# Patient Record
Sex: FEMALE | Race: WHITE | NOT HISPANIC OR LATINO | Employment: UNEMPLOYED | URBAN - METROPOLITAN AREA
[De-identification: names, ages, dates, MRNs, and addresses within clinical notes are randomized per-mention and may not be internally consistent; named-entity substitution may affect disease eponyms.]

---

## 2020-12-07 ENCOUNTER — OFFICE VISIT (OUTPATIENT)
Dept: OBSTETRICS AND GYNECOLOGY | Facility: CLINIC | Age: 55
End: 2020-12-07
Payer: COMMERCIAL

## 2020-12-07 VITALS
HEIGHT: 72 IN | BODY MASS INDEX: 24.38 KG/M2 | HEART RATE: 51 BPM | WEIGHT: 180 LBS | SYSTOLIC BLOOD PRESSURE: 117 MMHG | DIASTOLIC BLOOD PRESSURE: 74 MMHG

## 2020-12-07 DIAGNOSIS — Z12.31 BREAST CANCER SCREENING BY MAMMOGRAM: ICD-10-CM

## 2020-12-07 DIAGNOSIS — Z01.419 WELL WOMAN EXAM WITH ROUTINE GYNECOLOGICAL EXAM: Primary | ICD-10-CM

## 2020-12-07 DIAGNOSIS — N95.0 POST-MENOPAUSE BLEEDING: ICD-10-CM

## 2020-12-07 PROCEDURE — 99386 PREV VISIT NEW AGE 40-64: CPT | Mod: S$GLB,,, | Performed by: OBSTETRICS & GYNECOLOGY

## 2020-12-07 PROCEDURE — 3008F PR BODY MASS INDEX (BMI) DOCUMENTED: ICD-10-PCS | Mod: CPTII,S$GLB,, | Performed by: OBSTETRICS & GYNECOLOGY

## 2020-12-07 PROCEDURE — 3008F BODY MASS INDEX DOCD: CPT | Mod: CPTII,S$GLB,, | Performed by: OBSTETRICS & GYNECOLOGY

## 2020-12-07 PROCEDURE — 99386 PR PREVENTIVE VISIT,NEW,40-64: ICD-10-PCS | Mod: S$GLB,,, | Performed by: OBSTETRICS & GYNECOLOGY

## 2020-12-07 RX ORDER — ESTRADIOL 0.1 MG/G
CREAM VAGINAL DAILY
COMMUNITY
End: 2020-12-23 | Stop reason: SDUPTHER

## 2020-12-07 RX ORDER — ESCITALOPRAM OXALATE 10 MG/1
10 TABLET ORAL DAILY
COMMUNITY

## 2020-12-07 NOTE — PROGRESS NOTES
Subjective:       Patient ID: Idalmis Braden is a 55 y.o. female.    Chief Complaint:  Well Woman      History of Present Illness  She is doing well.  No new health issues,  No abnormal bleeding, No GI or Gu concerns,  No dyspareunia.     Her daughter is 16 .   Covid free     No GYN concerns  She has the mirena   She had it placed in   Rare spotting.   They moved in in July    No dyspareunia.    Rare IBS  colonoscpy 2 yrs ago in [place]  2 small polyps bx neg .  She saw Emanuel Valenzuela   She is on a HRT estradiol.  She is not a progesterone.    HPI      GYN & OB History  No LMP recorded. (Menstrual status: Other).     Date of Last Pap: No result found    OB History   No obstetric history on file.       Review of Systems  Review of Systems   Constitutional: Negative for activity change.   Eyes: Negative for visual disturbance.   Respiratory: Negative for shortness of breath.    Cardiovascular: Negative for chest pain.   Gastrointestinal: Negative for abdominal pain.   Genitourinary: Negative for vaginal bleeding.        No abnormal vaginal bleeding   Musculoskeletal: Negative for back pain.   Integumentary:  Negative for rash and breast mass.   Neurological: Negative for numbness.   Psychiatric/Behavioral:        No mood disturbance or changes    Breast: Negative for mass            Objective:    Physical Exam:   Constitutional: She is oriented to person, place, and time. She appears well-developed. She is cooperative.    HENT:   Head: Normocephalic.     Neck: Trachea normal. Neck supple. No thyromegaly present.    Cardiovascular: Normal rate, regular rhythm and normal heart sounds.     Pulmonary/Chest: Effort normal and breath sounds normal. Right breast exhibits no mass, no nipple discharge and no skin change. Left breast exhibits no mass, no nipple discharge and no skin change.   Bilateral fibrocystic changes  During the exam self breast exams discussed         Abdominal: Soft. Normal appearance. There  is no abdominal tenderness. There is no rebound and no guarding.     Genitourinary:    Vagina, uterus and rectum normal.   Rectum:      Guaiac result negative.   Guaiac negative stool.    Pelvic exam was performed with patient supine.   There is no lesion on the right labia. There is no lesion on the left labia. Uterus is not enlarged and not tender. Cervix is normal. Right adnexum displays no mass and no tenderness. Left adnexum displays no mass and no tenderness. No rectocele, cystocele or unspecified prolapse of vaginal walls in the vagina. Labial bartholins normal.Cervix exhibits no discharge. Additional cervical findings: pap smear done             Lymphadenopathy:        Head (right side): No submental and no submandibular adenopathy present.        Head (left side): No submental and no submandibular adenopathy present.     She has no cervical adenopathy.    Neurological: She is alert and oriented to person, place, and time.    Skin: Skin is warm.    Psychiatric: She has a normal mood and affect. Her speech is normal and behavior is normal. Thought content normal.          Assessment:        1. Well woman exam with routine gynecological exam    2. Breast cancer screening by mammogram    3. Post-menopause bleeding               Plan:      The IUD sting is not seen today   will do an U/s  She will wear the IUD for another year this a good progesterone support   .    I would Pap   Mammogram  Follow up one year or sooner if needed  Self breast exams  Consider health profile if labs not done with PCP  Bone density discussed   Pt is aware we call all results. If she does not hear from our office regarding her result within a week of having a study or procedure performed she is to call the office so that we can research the result for her as I do not know when she is scheduling her procedures.   Chaperone was present

## 2020-12-09 ENCOUNTER — PROCEDURE VISIT (OUTPATIENT)
Dept: OBSTETRICS AND GYNECOLOGY | Facility: CLINIC | Age: 55
End: 2020-12-09
Payer: COMMERCIAL

## 2020-12-09 DIAGNOSIS — N95.0 POST-MENOPAUSE BLEEDING: ICD-10-CM

## 2020-12-09 DIAGNOSIS — Z01.419 WELL WOMAN EXAM WITH ROUTINE GYNECOLOGICAL EXAM: ICD-10-CM

## 2020-12-09 DIAGNOSIS — Z12.31 BREAST CANCER SCREENING BY MAMMOGRAM: ICD-10-CM

## 2020-12-09 PROCEDURE — 76830 TRANSVAGINAL US NON-OB: CPT | Mod: S$GLB,,, | Performed by: OBSTETRICS & GYNECOLOGY

## 2020-12-09 PROCEDURE — 76830 PR  ECHOGRAPHY,TRANSVAGINAL: ICD-10-PCS | Mod: S$GLB,,, | Performed by: OBSTETRICS & GYNECOLOGY

## 2020-12-23 RX ORDER — ESTRADIOL 0.1 MG/G
1 CREAM VAGINAL
Qty: 42 G | Refills: 3 | Status: SHIPPED | OUTPATIENT
Start: 2020-12-24 | End: 2020-12-28

## 2020-12-23 NOTE — TELEPHONE ENCOUNTER
----- Message from Alondra Cason sent at 12/23/2020  2:13 PM CST -----  Type:  RX Refill Request    Who Called: pt   Refill or New Rx:refill  RX Name and Strength:estradioL (ESTRACE) 0.01 % (0.1 mg/gram) vaginal cream  How is the patient currently taking it? (ex. 1XDay):  Is this a 30 day or 90 day RX:90  Preferred Pharmacy with phone number:  Local or Mail Order:local   Ordering Provider:marlin   Would the patient rather a call back or a response via [facility]? Callback   Best Call Back Number:[phone]   Additional Information:

## 2020-12-24 ENCOUNTER — NURSE TRIAGE (OUTPATIENT)
Dept: ADMINISTRATIVE | Facility: CLINIC | Age: 55
End: 2020-12-24

## 2020-12-24 NOTE — TELEPHONE ENCOUNTER
Estradiol (Estrace) 1 mg by mouth daily instead of the vaginal cream.    Reason for Disposition   Caller requesting a NON-URGENT new prescription or refill and triager unable to refill per department policy    Additional Information   Negative: Drug overdose and triager unable to answer question   Negative: Caller requesting information unrelated to medicine   Negative: Caller requesting a prescription for Strep throat and has a positive culture result   Negative: Rash while taking a medication or within 3 days of stopping it   Negative: Immunization reaction suspected   Negative: Asthma and having symptoms of asthma (cough, wheezing, etc.)   Negative: Influenza symptoms and anti-viral med (such as Tamiflu) prescription request   Negative: Breastfeeding questions about mother's medicines and diet   Negative: Symptom of illness (e.g., headache, abdominal pain, earache, vomiting) that are more than mild   Negative: MORE THAN A DOUBLE DOSE of a prescription or over-the-counter (OTC) drug   Negative: DOUBLE DOSE (an extra dose or lesser amount) of over-the-counter (OTC) drug and any symptoms (e.g., dizziness, nausea, pain, sleepiness)   Negative: DOUBLE DOSE (an extra dose or lesser amount) of prescription drug and any symptoms (e.g., dizziness, nausea, pain, sleepiness)   Negative: Took another person's prescription drug   Negative: DOUBLE DOSE (an extra dose or lesser amount) of prescription drug and NO symptoms (Exception: a double dose of antibiotics)   Negative: Diabetes drug error or overdose (e.g., took wrong type of insulin or took extra dose)   Negative: Caller has medication question about med not prescribed by PCP and triager unable to answer question (e.g., compatibility with other med, storage)   Negative: Caller has NON-URGENT medication question about med that PCP or specialist prescribed and triager unable to answer question   Negative: Request for URGENT new prescription or refill of  'essential' medication (i.e., likelihood of harm to patient if not taken) and triager unable to fill per department policy   Negative: Prescription not at pharmacy and was prescribed by PCP recently (Exception: triager has access to EMR and prescription is recorded there. Go to Home Care and confirm for pharmacy.)   Negative: Pharmacy calling with prescription questions and triager unable to answer question   Negative: Caller has URGENT medication question about med that PCP or specialist prescribed and triager unable to answer question    Protocols used: MEDICATION QUESTION CALL-A-[place]

## 2020-12-28 RX ORDER — ESTRADIOL 1 MG/1
1 TABLET ORAL DAILY
Qty: 90 TABLET | Refills: 3 | Status: SHIPPED | OUTPATIENT
Start: 2020-12-28 | End: 2021-12-28

## 2021-02-08 ENCOUNTER — TELEPHONE (OUTPATIENT)
Dept: OBSTETRICS AND GYNECOLOGY | Facility: CLINIC | Age: 56
End: 2021-02-08

## 2021-02-08 DIAGNOSIS — Z91.89 AT HIGH RISK FOR BREAST CANCER: Primary | ICD-10-CM

## 2021-02-08 DIAGNOSIS — Z12.39 ENCOUNTER FOR SCREENING FOR MALIGNANT NEOPLASM OF BREAST, UNSPECIFIED SCREENING MODALITY: ICD-10-CM

## 2021-02-08 DIAGNOSIS — N60.89 OTHER BENIGN MAMMARY DYSPLASIAS OF UNSPECIFIED BREAST: ICD-10-CM

## 2021-03-25 DIAGNOSIS — Z79.890 HORMONE REPLACEMENT THERAPY: Primary | ICD-10-CM

## 2021-03-25 RX ORDER — ESTRADIOL 1 MG/1
1 TABLET ORAL DAILY
Qty: 30 TABLET | Refills: 11 | Status: SHIPPED | OUTPATIENT
Start: 2021-03-25 | End: 2022-01-18 | Stop reason: SDUPTHER

## 2021-06-07 ENCOUNTER — PATIENT OUTREACH (OUTPATIENT)
Dept: ADMINISTRATIVE | Facility: HOSPITAL | Age: 56
End: 2021-06-07

## 2021-11-18 ENCOUNTER — TELEPHONE (OUTPATIENT)
Dept: OBSTETRICS AND GYNECOLOGY | Facility: CLINIC | Age: 56
End: 2021-11-18
Payer: COMMERCIAL

## 2022-01-18 ENCOUNTER — OFFICE VISIT (OUTPATIENT)
Dept: OBSTETRICS AND GYNECOLOGY | Facility: CLINIC | Age: 57
End: 2022-01-18
Payer: COMMERCIAL

## 2022-01-18 VITALS
BODY MASS INDEX: 25.77 KG/M2 | SYSTOLIC BLOOD PRESSURE: 128 MMHG | WEIGHT: 190 LBS | HEART RATE: 58 BPM | DIASTOLIC BLOOD PRESSURE: 84 MMHG

## 2022-01-18 DIAGNOSIS — Z01.419 WELL WOMAN EXAM WITH ROUTINE GYNECOLOGICAL EXAM: Primary | ICD-10-CM

## 2022-01-18 DIAGNOSIS — Z12.31 BREAST CANCER SCREENING BY MAMMOGRAM: ICD-10-CM

## 2022-01-18 DIAGNOSIS — Z78.0 MENOPAUSE: ICD-10-CM

## 2022-01-18 DIAGNOSIS — F41.9 ANXIETY: ICD-10-CM

## 2022-01-18 DIAGNOSIS — F52.0 HYPOACTIVE SEXUAL DESIRE DISORDER: ICD-10-CM

## 2022-01-18 DIAGNOSIS — Z79.890 HORMONE REPLACEMENT THERAPY: ICD-10-CM

## 2022-01-18 PROCEDURE — 3079F DIAST BP 80-89 MM HG: CPT | Mod: CPTII,S$GLB,, | Performed by: OBSTETRICS & GYNECOLOGY

## 2022-01-18 PROCEDURE — 3074F SYST BP LT 130 MM HG: CPT | Mod: CPTII,S$GLB,, | Performed by: OBSTETRICS & GYNECOLOGY

## 2022-01-18 PROCEDURE — 99396 PREV VISIT EST AGE 40-64: CPT | Mod: S$GLB,,, | Performed by: OBSTETRICS & GYNECOLOGY

## 2022-01-18 PROCEDURE — 3079F PR MOST RECENT DIASTOLIC BLOOD PRESSURE 80-89 MM HG: ICD-10-PCS | Mod: CPTII,S$GLB,, | Performed by: OBSTETRICS & GYNECOLOGY

## 2022-01-18 PROCEDURE — 1159F PR MEDICATION LIST DOCUMENTED IN MEDICAL RECORD: ICD-10-PCS | Mod: CPTII,S$GLB,, | Performed by: OBSTETRICS & GYNECOLOGY

## 2022-01-18 PROCEDURE — 3008F PR BODY MASS INDEX (BMI) DOCUMENTED: ICD-10-PCS | Mod: CPTII,S$GLB,, | Performed by: OBSTETRICS & GYNECOLOGY

## 2022-01-18 PROCEDURE — 1159F MED LIST DOCD IN RCRD: CPT | Mod: CPTII,S$GLB,, | Performed by: OBSTETRICS & GYNECOLOGY

## 2022-01-18 PROCEDURE — 3008F BODY MASS INDEX DOCD: CPT | Mod: CPTII,S$GLB,, | Performed by: OBSTETRICS & GYNECOLOGY

## 2022-01-18 PROCEDURE — 3074F PR MOST RECENT SYSTOLIC BLOOD PRESSURE < 130 MM HG: ICD-10-PCS | Mod: CPTII,S$GLB,, | Performed by: OBSTETRICS & GYNECOLOGY

## 2022-01-18 PROCEDURE — 99396 PR PREVENTIVE VISIT,EST,40-64: ICD-10-PCS | Mod: S$GLB,,, | Performed by: OBSTETRICS & GYNECOLOGY

## 2022-01-18 RX ORDER — ALPRAZOLAM 0.5 MG/1
0.5 TABLET ORAL EVERY 8 HOURS PRN
Qty: 28 TABLET | Refills: 0 | Status: SHIPPED | OUTPATIENT
Start: 2022-01-18 | End: 2023-02-28

## 2022-01-18 RX ORDER — ESTRADIOL 1 MG/1
1 TABLET ORAL DAILY
Qty: 30 TABLET | Refills: 11 | Status: SHIPPED | OUTPATIENT
Start: 2022-01-18 | End: 2022-03-25 | Stop reason: SDUPTHER

## 2022-01-18 RX ORDER — FLIBANSERIN 100 MG/1
100 TABLET, FILM COATED ORAL DAILY
Qty: 30 TABLET | Refills: 12 | Status: SHIPPED | OUTPATIENT
Start: 2022-01-18 | End: 2023-02-28

## 2022-01-18 RX ORDER — PROGESTERONE 100 MG/1
100 CAPSULE ORAL NIGHTLY
Qty: 90 CAPSULE | Refills: 3 | Status: SHIPPED | OUTPATIENT
Start: 2022-01-18 | End: 2023-02-28

## 2022-01-18 NOTE — PROGRESS NOTES
Subjective:       Patient ID: Idalmis Braden is a 56 y.o. female.    Chief Complaint:  Well Woman      History of Present Illness  She is doing well.  No new health issues,  No abnormal bleeding, No GI or Gu concerns,  No dyspareunia.   Decrease desire   Wearing her IUD  On HRT   Does not desire it removed this year   No prob   HPI      GYN & OB History  No LMP recorded. (Menstrual status: Other).     Date of Last Pap: No result found    OB History   No obstetric history on file.       Review of Systems  Review of Systems   Constitutional: Negative for activity change.   Eyes: Negative for visual disturbance.   Respiratory: Negative for shortness of breath.    Cardiovascular: Negative for chest pain.   Gastrointestinal: Negative for abdominal pain.   Genitourinary: Negative for vaginal bleeding.        No abnormal vaginal bleeding   Musculoskeletal: Negative for back pain.   Integumentary:  Negative for rash and breast mass.   Neurological: Negative for numbness.   Psychiatric/Behavioral:        No mood disturbance or changes    Breast: Negative for mass            Objective:    Physical Exam:   Constitutional: She is oriented to person, place, and time. She appears well-developed. She is cooperative.    HENT:   Head: Normocephalic.     Neck: Trachea normal. No thyromegaly present.    Cardiovascular: Normal rate, regular rhythm and normal heart sounds.     Pulmonary/Chest: Effort normal and breath sounds normal. Right breast exhibits no mass, no nipple discharge and no skin change. Left breast exhibits no mass, no nipple discharge and no skin change.   Bilateral fibrocystic changes  During the exam self breast exams discussed         Abdominal: Soft. There is no abdominal tenderness. There is no rebound and no guarding.     Genitourinary:    Vagina, uterus and rectum normal.   Rectum:      Guaiac result negative.   Guaiac negative stool.    Pelvic exam was performed with patient supine.   Labial bartholins  normal.There is no lesion on the right labia. There is no lesion on the left labia. Cervix is normal. Right adnexum displays no mass and no tenderness. Left adnexum displays no mass and no tenderness. Cervix exhibits no discharge. Also,  pap results normal   Uterus is not enlarged and not tender.    Genitourinary Comments: The iiud string is seen she is not ready for removal                Lymphadenopathy:        Head (right side): No submental and no submandibular adenopathy present.        Head (left side): No submental and no submandibular adenopathy present.     She has no cervical adenopathy.    Neurological: She is alert and oriented to person, place, and time.    Skin: Skin is warm.    Psychiatric: She has a normal mood and affect. Her speech is normal and behavior is normal. Thought content normal.          Assessment:        1. Well woman exam with routine gynecological exam    2. Breast cancer screening by mammogram    3. Anxiety    4. Hypoactive sexual desire disorder    5. Menopause               Plan:           Add progesterone   Try addyi discussed   Pap   Mammogram  Follow up one year or sooner if needed  Self breast exams  Consider health profile if labs not done with PCP  Recommend colonoscopy as indicated  Bone density discussed   Pt is aware we call all results. If she does not hear from our office regarding her result within a week of having a study or procedure performed she is to call the office so that we can research the result for her as I do not know when she is scheduling her procedures.   Chaperone was present

## 2023-02-28 ENCOUNTER — OFFICE VISIT (OUTPATIENT)
Dept: OBSTETRICS AND GYNECOLOGY | Facility: CLINIC | Age: 58
End: 2023-02-28
Payer: COMMERCIAL

## 2023-02-28 VITALS
WEIGHT: 187 LBS | BODY MASS INDEX: 25.36 KG/M2 | HEART RATE: 96 BPM | DIASTOLIC BLOOD PRESSURE: 81 MMHG | SYSTOLIC BLOOD PRESSURE: 115 MMHG

## 2023-02-28 DIAGNOSIS — Z79.890 HORMONE REPLACEMENT THERAPY (HRT): ICD-10-CM

## 2023-02-28 DIAGNOSIS — Z78.0 MENOPAUSE: ICD-10-CM

## 2023-02-28 DIAGNOSIS — Z12.31 BREAST CANCER SCREENING BY MAMMOGRAM: ICD-10-CM

## 2023-02-28 DIAGNOSIS — Z91.89 AT HIGH RISK FOR BREAST CANCER: ICD-10-CM

## 2023-02-28 DIAGNOSIS — Z01.419 WELL WOMAN EXAM WITH ROUTINE GYNECOLOGICAL EXAM: Primary | ICD-10-CM

## 2023-02-28 DIAGNOSIS — Z12.11 COLON CANCER SCREENING: ICD-10-CM

## 2023-02-28 PROCEDURE — 3074F PR MOST RECENT SYSTOLIC BLOOD PRESSURE < 130 MM HG: ICD-10-PCS | Mod: CPTII,S$GLB,, | Performed by: OBSTETRICS & GYNECOLOGY

## 2023-02-28 PROCEDURE — 3074F SYST BP LT 130 MM HG: CPT | Mod: CPTII,S$GLB,, | Performed by: OBSTETRICS & GYNECOLOGY

## 2023-02-28 PROCEDURE — 3008F BODY MASS INDEX DOCD: CPT | Mod: CPTII,S$GLB,, | Performed by: OBSTETRICS & GYNECOLOGY

## 2023-02-28 PROCEDURE — 99396 PREV VISIT EST AGE 40-64: CPT | Mod: S$GLB,,, | Performed by: OBSTETRICS & GYNECOLOGY

## 2023-02-28 PROCEDURE — 3079F DIAST BP 80-89 MM HG: CPT | Mod: CPTII,S$GLB,, | Performed by: OBSTETRICS & GYNECOLOGY

## 2023-02-28 PROCEDURE — 3079F PR MOST RECENT DIASTOLIC BLOOD PRESSURE 80-89 MM HG: ICD-10-PCS | Mod: CPTII,S$GLB,, | Performed by: OBSTETRICS & GYNECOLOGY

## 2023-02-28 PROCEDURE — 1159F PR MEDICATION LIST DOCUMENTED IN MEDICAL RECORD: ICD-10-PCS | Mod: CPTII,S$GLB,, | Performed by: OBSTETRICS & GYNECOLOGY

## 2023-02-28 PROCEDURE — 3008F PR BODY MASS INDEX (BMI) DOCUMENTED: ICD-10-PCS | Mod: CPTII,S$GLB,, | Performed by: OBSTETRICS & GYNECOLOGY

## 2023-02-28 PROCEDURE — 1159F MED LIST DOCD IN RCRD: CPT | Mod: CPTII,S$GLB,, | Performed by: OBSTETRICS & GYNECOLOGY

## 2023-02-28 PROCEDURE — 99396 PR PREVENTIVE VISIT,EST,40-64: ICD-10-PCS | Mod: S$GLB,,, | Performed by: OBSTETRICS & GYNECOLOGY

## 2023-02-28 NOTE — PROGRESS NOTES
Subjective:       Patient ID: Idalmis Braden is a 57 y.o. female.    Chief Complaint:  Well Woman      History of Present Illness  She is doing well.  No new health issues,  No abnormal bleeding, No GI or Gu concerns,  No dyspareunia.   No dyspareunia  no GI or   she is on HRT   she is estrace and wears a mirena      HPI      GYN & OB History  No LMP recorded. (Menstrual status: Other).     Date of Last Pap: No result found    OB History   No obstetric history on file.       Review of Systems  Review of Systems   Constitutional:  Negative for activity change.   Eyes:  Negative for visual disturbance.   Respiratory:  Negative for shortness of breath.    Cardiovascular:  Negative for chest pain.   Gastrointestinal:  Negative for abdominal pain.   Genitourinary:  Negative for vaginal bleeding.        No abnormal vaginal bleeding   Musculoskeletal:  Negative for back pain.   Integumentary:  Negative for rash and breast mass.   Neurological:  Negative for numbness.   Psychiatric/Behavioral:          No mood disturbance or changes    Breast: Negative for mass          Objective:    Physical Exam:   Constitutional: She is oriented to person, place, and time. She appears well-developed. She is cooperative.    HENT:   Head: Normocephalic.     Neck: Trachea normal. No thyromegaly present.    Cardiovascular:  Normal rate, regular rhythm and normal heart sounds.             Pulmonary/Chest: Effort normal and breath sounds normal. Right breast exhibits no mass, no nipple discharge and no skin change. Left breast exhibits no mass, no nipple discharge and no skin change.   Bilateral fibrocystic changes  During the exam self breast exams discussed         Abdominal: Soft. There is no abdominal tenderness. There is no rebound and no guarding.     Genitourinary:    Vagina, uterus and rectum normal.   Rectum:      Guaiac result negative.   Guaiac negative stool.    Pelvic exam was performed with patient supine.   Labial  bartholins normal.There is no lesion on the right labia. There is no lesion on the left labia. Cervix is normal. Right adnexum displays no mass and no tenderness. Left adnexum displays no mass and no tenderness. Cervix exhibits no discharge. Uterus is not enlarged and not tender.              Lymphadenopathy:        Head (right side): No submental and no submandibular adenopathy present.        Head (left side): No submental and no submandibular adenopathy present.     She has no cervical adenopathy.    Neurological: She is alert and oriented to person, place, and time.    Skin: Skin is warm.    Psychiatric: She has a normal mood and affect. Her speech is normal and behavior is normal. Thought content normal.        Assessment:        1. Well woman exam with routine gynecological exam    2. Breast cancer screening by mammogram    3. At high risk for breast cancer               Plan:         Needs mammogram and mri    discussed     Pap   Mammogram  Follow up one year or sooner if needed  Self breast exams  Consider health profile if labs not done with PCP  Recommend colonoscopy as indicated  Bone density discussed   Pt is aware we call all results. If she does not hear from our office regarding her result within a week of having a study or procedure performed she is to call the office so that we can research the result for her as I do not know when she is scheduling her procedures.   Chaperone was present

## 2023-03-01 ENCOUNTER — PROCEDURE VISIT (OUTPATIENT)
Dept: OBSTETRICS AND GYNECOLOGY | Facility: CLINIC | Age: 58
End: 2023-03-01
Payer: COMMERCIAL

## 2023-03-01 DIAGNOSIS — Z78.0 MENOPAUSE: ICD-10-CM

## 2023-03-01 DIAGNOSIS — Z91.89 AT HIGH RISK FOR BREAST CANCER: ICD-10-CM

## 2023-03-01 DIAGNOSIS — Z79.890 HORMONE REPLACEMENT THERAPY (HRT): ICD-10-CM

## 2023-03-01 PROCEDURE — 76830 TRANSVAGINAL US NON-OB: CPT | Mod: S$GLB,,, | Performed by: OBSTETRICS & GYNECOLOGY

## 2023-03-01 PROCEDURE — 76830 US OB/GYN PROCEDURE (VIEWPOINT): ICD-10-PCS | Mod: S$GLB,,, | Performed by: OBSTETRICS & GYNECOLOGY

## 2023-03-02 DIAGNOSIS — R93.89 THICKENED ENDOMETRIUM: ICD-10-CM

## 2023-03-02 DIAGNOSIS — Z78.0 MENOPAUSE: Primary | ICD-10-CM

## 2023-03-02 DIAGNOSIS — R93.89 THICKENED ENDOMETRIUM: Primary | ICD-10-CM

## 2023-03-02 RX ORDER — PROGESTERONE 200 MG/1
200 CAPSULE ORAL NIGHTLY
Qty: 30 CAPSULE | Refills: 2 | Status: SHIPPED | OUTPATIENT
Start: 2023-03-02 | End: 2023-07-10

## 2023-03-02 RX ORDER — PROGESTERONE 100 MG/1
100 CAPSULE ORAL NIGHTLY
Qty: 90 CAPSULE | Refills: 3 | Status: CANCELLED | OUTPATIENT
Start: 2023-03-02 | End: 2024-03-01

## 2023-03-02 NOTE — PROGRESS NOTES
03/02/2023 5:00 PM Spoke to patient about results. She is going to start on the progesterone 100mg qhs and see how she does on it before deciding to either remove or replace the mirena. She would like to do a follow up u/s to check her endometrial lining since it was slightly thickened. Scheduled u/s in 6 months. Patient was advised to contact our office to let us know when she is ready to remove or replace the mirena. Patient voiced understanding.

## 2023-03-07 LAB — Lab: NORMAL

## 2023-07-10 RX ORDER — PROGESTERONE 200 MG/1
200 CAPSULE ORAL NIGHTLY
Qty: 90 CAPSULE | Refills: 3 | Status: SHIPPED | OUTPATIENT
Start: 2023-07-10 | End: 2023-07-11 | Stop reason: SDUPTHER

## 2023-07-11 DIAGNOSIS — Z78.0 MENOPAUSE: Primary | ICD-10-CM

## 2023-07-11 RX ORDER — PROGESTERONE 200 MG/1
200 CAPSULE ORAL NIGHTLY
Qty: 90 CAPSULE | Refills: 3 | Status: SHIPPED | OUTPATIENT
Start: 2023-07-11 | End: 2024-07-10

## 2023-08-15 ENCOUNTER — PROCEDURE VISIT (OUTPATIENT)
Dept: OBSTETRICS AND GYNECOLOGY | Facility: CLINIC | Age: 58
End: 2023-08-15
Payer: COMMERCIAL

## 2023-08-15 DIAGNOSIS — R93.89 THICKENED ENDOMETRIUM: ICD-10-CM

## 2023-08-15 PROCEDURE — 76830 TRANSVAGINAL US NON-OB: CPT | Mod: S$GLB,,, | Performed by: OBSTETRICS & GYNECOLOGY

## 2023-08-15 PROCEDURE — 76830 US OB/GYN PROCEDURE (VIEWPOINT): ICD-10-PCS | Mod: S$GLB,,, | Performed by: OBSTETRICS & GYNECOLOGY

## 2023-12-22 ENCOUNTER — TELEPHONE (OUTPATIENT)
Dept: OBSTETRICS AND GYNECOLOGY | Facility: CLINIC | Age: 58
End: 2023-12-22
Payer: COMMERCIAL

## 2023-12-22 NOTE — TELEPHONE ENCOUNTER
I returned the pt's call today. She states that she had a a CT scan completed w/ her PCP. Allegedly, the issues she had the CT scan for proved to be caused by the IUD she has in place. She needs to schedule a clinic appt to discuss this and potentially have this removed. She gave verbal understanding that Dr. Hardy and his staff are out of the office today and until 12/27/2023, but will be expecting a call when they return. Debora Diehl

## 2023-12-22 NOTE — TELEPHONE ENCOUNTER
----- Message from Jamarcus Ramos sent at 12/22/2023  8:30 AM CST -----  Contact: jorge Raygoza is calling regarding test results.  Please give her a call back at [phone]

## 2023-12-27 ENCOUNTER — TELEPHONE (OUTPATIENT)
Dept: OBSTETRICS AND GYNECOLOGY | Facility: CLINIC | Age: 58
End: 2023-12-27
Payer: COMMERCIAL

## 2023-12-27 NOTE — TELEPHONE ENCOUNTER
Spoke to patient, she states she is needing to schedule to have her IUD removed as she recently was experiencing blood in her urine and after doing a CT scan of her abdomen she was told the IUD is what is causing the problems. Patient is scheduled for next available to have IUD removed.

## 2023-12-28 ENCOUNTER — PROCEDURE VISIT (OUTPATIENT)
Dept: OBSTETRICS AND GYNECOLOGY | Facility: CLINIC | Age: 58
End: 2023-12-28
Payer: COMMERCIAL

## 2023-12-28 VITALS
DIASTOLIC BLOOD PRESSURE: 79 MMHG | WEIGHT: 181 LBS | HEART RATE: 76 BPM | SYSTOLIC BLOOD PRESSURE: 127 MMHG | BODY MASS INDEX: 24.55 KG/M2

## 2023-12-28 DIAGNOSIS — R10.2 PELVIC PAIN: Primary | ICD-10-CM

## 2023-12-28 PROCEDURE — 99499 NO LOS: ICD-10-PCS | Mod: S$GLB,,, | Performed by: OBSTETRICS & GYNECOLOGY

## 2023-12-28 PROCEDURE — 58301 REMOVAL OF IUD: ICD-10-PCS | Mod: S$GLB,,, | Performed by: OBSTETRICS & GYNECOLOGY

## 2023-12-28 PROCEDURE — 99499 UNLISTED E&M SERVICE: CPT | Mod: S$GLB,,, | Performed by: OBSTETRICS & GYNECOLOGY

## 2023-12-28 PROCEDURE — 58301 REMOVE INTRAUTERINE DEVICE: CPT | Mod: S$GLB,,, | Performed by: OBSTETRICS & GYNECOLOGY

## 2023-12-29 NOTE — PROCEDURES
Removal of IUD    Date/Time: 12/28/2023 10:00 AM    Performed by: Guanaco Hardy III, MD  Authorized by: Guanaco Hardy III, MD    Procedure risks and benefits discussed: yes    Patient questions answered: yes    Educational handouts given: no    Removed with no complications: yes     Dom very well

## 2024-03-13 DIAGNOSIS — Z79.890 HORMONE REPLACEMENT THERAPY: ICD-10-CM

## 2024-03-13 RX ORDER — ESTRADIOL 1 MG/1
TABLET ORAL
Qty: 90 TABLET | Refills: 3 | Status: SHIPPED | OUTPATIENT
Start: 2024-03-13

## 2024-03-14 ENCOUNTER — OFFICE VISIT (OUTPATIENT)
Dept: OBSTETRICS AND GYNECOLOGY | Facility: CLINIC | Age: 59
End: 2024-03-14
Payer: COMMERCIAL

## 2024-03-14 VITALS
SYSTOLIC BLOOD PRESSURE: 123 MMHG | BODY MASS INDEX: 24.65 KG/M2 | HEART RATE: 83 BPM | DIASTOLIC BLOOD PRESSURE: 87 MMHG | WEIGHT: 182 LBS | HEIGHT: 72 IN

## 2024-03-14 DIAGNOSIS — Z79.890 HORMONE REPLACEMENT THERAPY: ICD-10-CM

## 2024-03-14 DIAGNOSIS — R31.29 MICROSCOPIC HEMATURIA: ICD-10-CM

## 2024-03-14 DIAGNOSIS — Z12.31 BREAST CANCER SCREENING BY MAMMOGRAM: Primary | ICD-10-CM

## 2024-03-14 DIAGNOSIS — N95.0 POSTMENOPAUSE BLEEDING: ICD-10-CM

## 2024-03-14 DIAGNOSIS — N95.0 POSTMENOPAUSE BLEEDING: Primary | ICD-10-CM

## 2024-03-14 DIAGNOSIS — Z01.419 WELL WOMAN EXAM WITH ROUTINE GYNECOLOGICAL EXAM: ICD-10-CM

## 2024-03-14 PROCEDURE — 3008F BODY MASS INDEX DOCD: CPT | Mod: CPTII,S$GLB,, | Performed by: OBSTETRICS & GYNECOLOGY

## 2024-03-14 PROCEDURE — 3074F SYST BP LT 130 MM HG: CPT | Mod: CPTII,S$GLB,, | Performed by: OBSTETRICS & GYNECOLOGY

## 2024-03-14 PROCEDURE — 3079F DIAST BP 80-89 MM HG: CPT | Mod: CPTII,S$GLB,, | Performed by: OBSTETRICS & GYNECOLOGY

## 2024-03-14 PROCEDURE — 99396 PREV VISIT EST AGE 40-64: CPT | Mod: S$GLB,,, | Performed by: OBSTETRICS & GYNECOLOGY

## 2024-03-14 PROCEDURE — 1159F MED LIST DOCD IN RCRD: CPT | Mod: CPTII,S$GLB,, | Performed by: OBSTETRICS & GYNECOLOGY

## 2024-03-14 NOTE — PROGRESS NOTES
Subjective:       Patient ID: Idalmis Braden is a 58 y.o. female.    Chief Complaint:  Well Woman      History of Present Illness  She is doing well.  No new health issues,  No abnormal bleeding, No GI or Gu concerns,  No dyspareunia.   She is doing well and on her HRT but she was noted to have some post menopausal bleeding.   She does have her colonoscopy set up and in addition   she is also having a cysto for microscopic hematuria.      HPI      GYN & OB History  No LMP recorded. Patient is postmenopausal.     Date of Last Pap: No result found    OB History   No obstetric history on file.       Review of Systems  Review of Systems   Constitutional:  Negative for activity change.   Eyes:  Negative for visual disturbance.   Respiratory:  Negative for shortness of breath.    Cardiovascular:  Negative for chest pain.   Gastrointestinal:  Negative for abdominal pain.   Genitourinary:  Negative for vaginal bleeding.        No abnormal vaginal bleeding   Musculoskeletal:  Negative for back pain.   Integumentary:  Negative for rash and breast mass.   Neurological:  Negative for numbness.   Psychiatric/Behavioral:          No mood disturbance or changes    Breast: Negative for mass            Objective:    Physical Exam:   Constitutional: She is oriented to person, place, and time. She appears well-developed. She is cooperative.    HENT:   Head: Normocephalic.     Neck: Trachea normal. No thyromegaly present.    Cardiovascular:  Normal rate, regular rhythm and normal heart sounds.             Pulmonary/Chest: Effort normal and breath sounds normal. Right breast exhibits no mass, no nipple discharge and no skin change. Left breast exhibits no mass, no nipple discharge and no skin change.        Abdominal: Soft. There is no abdominal tenderness. There is no rebound and no guarding.     Genitourinary:    Vagina and uterus normal.      Pelvic exam was performed with patient supine.     Labial bartholins normal.There is no  lesion on the right labia. There is no lesion on the left labia. Cervix is normal. Right adnexum displays no mass and no tenderness. Left adnexum displays no mass and no tenderness. Cervix exhibits no discharge. Uterus is not enlarged and not tender.              Lymphadenopathy:        Head (right side): No submental and no submandibular adenopathy present.        Head (left side): No submental and no submandibular adenopathy present.     She has no cervical adenopathy.    Neurological: She is alert and oriented to person, place, and time.    Skin: Skin is warm.    Psychiatric: She has a normal mood and affect. Her speech is normal and behavior is normal. Thought content normal.          Assessment:        1. Breast cancer screening by mammogram    2. Well woman exam with routine gynecological exam    3. Hormone replacement therapy    4. Microscopic hematuria    5. Postmenopause bleeding               Plan:             Pap  this is done   Mammogram  Follow up one year or sooner if needed  Self breast exams  Consider health profile if labs not done with PCP  Recommend colonoscopy as indicated  Bone density discussed   Pt is aware we call all results. If she does not hear from our office regarding her result within a week of having a study or procedure performed she is to call the office so that we can research the result for her as I do not know when she is scheduling her procedures.   Chaperone was present

## 2024-03-28 ENCOUNTER — PROCEDURE VISIT (OUTPATIENT)
Dept: OBSTETRICS AND GYNECOLOGY | Facility: CLINIC | Age: 59
End: 2024-03-28
Payer: COMMERCIAL

## 2024-03-28 DIAGNOSIS — N95.0 POSTMENOPAUSE BLEEDING: ICD-10-CM

## 2024-03-28 PROCEDURE — 76830 TRANSVAGINAL US NON-OB: CPT | Mod: S$GLB,,, | Performed by: OBSTETRICS & GYNECOLOGY

## 2024-04-01 LAB — Lab: NORMAL

## 2024-04-01 NOTE — PROGRESS NOTES
Hi Idalmis, your ultra sound looks perfect and your endometrial lining is thin at 2 mm.  That is very reassuring.  Thank you for doing the study

## 2024-04-04 ENCOUNTER — TELEPHONE (OUTPATIENT)
Dept: OBSTETRICS AND GYNECOLOGY | Facility: CLINIC | Age: 59
End: 2024-04-04
Payer: COMMERCIAL

## 2024-04-04 ENCOUNTER — PATIENT MESSAGE (OUTPATIENT)
Dept: OBSTETRICS AND GYNECOLOGY | Facility: CLINIC | Age: 59
End: 2024-04-04
Payer: COMMERCIAL

## 2024-04-04 NOTE — TELEPHONE ENCOUNTER
Lvm for patient to give us a call back to explain ultrasound and to possible procedure. I am also going to send her a portal message.

## 2024-05-06 ENCOUNTER — TELEPHONE (OUTPATIENT)
Dept: OBSTETRICS AND GYNECOLOGY | Facility: CLINIC | Age: 59
End: 2024-05-06
Payer: COMMERCIAL

## 2024-05-06 NOTE — TELEPHONE ENCOUNTER
I called to see if she is still having spotting and to see if her colonoscopy was preformed and the cysto

## 2024-05-10 ENCOUNTER — PROCEDURE VISIT (OUTPATIENT)
Dept: OBSTETRICS AND GYNECOLOGY | Facility: CLINIC | Age: 59
End: 2024-05-10
Payer: COMMERCIAL

## 2024-05-10 VITALS
WEIGHT: 182 LBS | SYSTOLIC BLOOD PRESSURE: 130 MMHG | HEART RATE: 75 BPM | DIASTOLIC BLOOD PRESSURE: 86 MMHG | BODY MASS INDEX: 24.68 KG/M2

## 2024-05-10 DIAGNOSIS — N95.0 POSTMENOPAUSE BLEEDING: Primary | ICD-10-CM

## 2024-05-10 PROCEDURE — 58100 BIOPSY OF UTERUS LINING: CPT | Mod: S$GLB,,, | Performed by: OBSTETRICS & GYNECOLOGY

## 2024-05-10 NOTE — PROCEDURES
Endometrial biopsy    Date/Time: 5/10/2024 9:45 AM    Performed by: Guanaco Hardy III, MD  Authorized by: Guanaco Hardy III, MD    Consent:     Consent given by:  Patient    Patient questions answered: yes      Patient agrees, verbalizes understanding, and wants to proceed: yes    Indication:     Indications: Post-menopausal bleeding    Procedure:     Cervix cleaned and prepped: yes      The cervix was dilated: yes      Uterus sound depth (cm):  7    Specimen collected: specimen collected and sent to pathology    Comments:     Procedure comments:  Dom well    female chaperone was present

## 2024-05-13 DIAGNOSIS — N71.9 ENDOMETRITIS: Primary | ICD-10-CM

## 2024-05-13 RX ORDER — AZITHROMYCIN 250 MG/1
TABLET, FILM COATED ORAL
Qty: 2 TABLET | Refills: 0 | Status: SHIPPED | OUTPATIENT
Start: 2024-05-13 | End: 2024-05-18

## 2024-07-14 ENCOUNTER — PATIENT MESSAGE (OUTPATIENT)
Dept: OBSTETRICS AND GYNECOLOGY | Facility: CLINIC | Age: 59
End: 2024-07-14
Payer: COMMERCIAL

## 2024-07-14 DIAGNOSIS — Z78.0 MENOPAUSE: ICD-10-CM

## 2024-07-15 RX ORDER — PROGESTERONE 200 MG/1
200 CAPSULE ORAL NIGHTLY
Qty: 90 CAPSULE | Refills: 3 | Status: SHIPPED | OUTPATIENT
Start: 2024-07-15 | End: 2025-07-15

## 2024-07-15 NOTE — TELEPHONE ENCOUNTER
----- Message from Susie Crowe sent at 7/15/2024  3:16 PM CDT -----  Patient requesting a call back in regards to bleeding please call her back at [phone]

## 2024-07-15 NOTE — TELEPHONE ENCOUNTER
Spoke to patient and scheduled ultrasound and office visit for abnormal bleeding      Patient request refill. Allergies reviewed. Pharmacy up to date. Medication pending. Please approve.

## 2024-07-17 ENCOUNTER — PROCEDURE VISIT (OUTPATIENT)
Dept: OBSTETRICS AND GYNECOLOGY | Facility: CLINIC | Age: 59
End: 2024-07-17
Payer: COMMERCIAL

## 2024-07-17 ENCOUNTER — OFFICE VISIT (OUTPATIENT)
Dept: OBSTETRICS AND GYNECOLOGY | Facility: CLINIC | Age: 59
End: 2024-07-17
Payer: COMMERCIAL

## 2024-07-17 VITALS
HEIGHT: 72 IN | SYSTOLIC BLOOD PRESSURE: 124 MMHG | DIASTOLIC BLOOD PRESSURE: 84 MMHG | HEART RATE: 69 BPM | BODY MASS INDEX: 24.79 KG/M2 | WEIGHT: 183 LBS

## 2024-07-17 DIAGNOSIS — N95.0 POSTMENOPAUSE BLEEDING: Primary | ICD-10-CM

## 2024-07-17 DIAGNOSIS — N93.9 ABNORMAL UTERINE BLEEDING: Primary | ICD-10-CM

## 2024-07-17 DIAGNOSIS — N93.9 ABNORMAL UTERINE BLEEDING: ICD-10-CM

## 2024-07-17 PROCEDURE — 76830 TRANSVAGINAL US NON-OB: CPT | Mod: S$GLB,,, | Performed by: OBSTETRICS & GYNECOLOGY

## 2024-07-17 NOTE — PROGRESS NOTES
Subjective:       Patient ID: Idalmis Braden is a 59 y.o. female.    Chief Complaint:  Vaginal Bleeding      History of Present Illness  She is here to evaluate .post menopausal bleeding she is on her HRT   she notes today as well       GYN & OB History  No LMP recorded. Patient is postmenopausal.     Date of Last Pap: 4/1/2024    OB History   No obstetric history on file.       Review of Systems  Review of Systems   Constitutional:  Negative for activity change.   Eyes:  Negative for visual disturbance.   Respiratory:  Negative for shortness of breath.    Cardiovascular:  Negative for chest pain.   Gastrointestinal:  Negative for abdominal pain.   Genitourinary:  Positive for vaginal bleeding.        No abnormal vaginal bleeding   Musculoskeletal:  Negative for back pain.   Integumentary:  Negative for rash and breast mass.   Neurological:  Negative for numbness.   Psychiatric/Behavioral:          No mood disturbance or changes    Breast: Negative for mass            Objective:    Physical Exam:   Constitutional: She is oriented to person, place, and time. She appears well-developed.               Genitourinary:    Vagina, uterus, right adnexa and left adnexa normal.      Pelvic exam was performed with patient supine.     Labial bartholins normal.There is no tenderness or lesion on the right labia. There is no tenderness or lesion on the left labia. Cervix is normal.    Genitourinary Comments: Blood in the vault                  Neurological: She is alert and oriented to person, place, and time.            Assessment:        1. Postmenopause bleeding                 Plan:             Hysteroscopy with myosure  discussed with her in detail   Pt is aware we call all results. If she does not hear from our office regarding her result within a week of having a study or procedure performed she is to call the office so that we can research the result for her.  Discussed follow up.  She is to rtn if her symptoms do not  resolve or if persist  Chaperone was present

## 2024-07-22 ENCOUNTER — TELEPHONE (OUTPATIENT)
Dept: OBSTETRICS AND GYNECOLOGY | Facility: CLINIC | Age: 59
End: 2024-07-22
Payer: COMMERCIAL

## 2024-07-22 NOTE — TELEPHONE ENCOUNTER
----- Message from Kadie Banerjee sent at 7/22/2024  2:43 PM CDT -----  Type:  Needs Medical Advice    Who Called: Idalmis Braden    Symptoms (please be specific): -   How long has patient had these symptoms:  --  Pharmacy name and phone #:  -  Would the patient rather a call back or a response via [facility]?    Best Call Back Number: [phone]    Additional Information:  pt would like to discuss upcoming procedure w/ nurse, please call

## 2024-07-22 NOTE — TELEPHONE ENCOUNTER
Spoke to patient, she had insurance questions. Advised I will reach out to [facility] and we will contact her back.

## 2024-07-22 NOTE — TELEPHONE ENCOUNTER
----- Message from Jesusita Neal sent at 7/22/2024 12:35 PM CDT -----  .Type:  Patient Returning Call    Who Called:Idalmis  Who Left Message for Patient:Nurse  Does the patient know what this is regarding?:Yes  Would the patient rather a call back or a response via [employer] [facility] [employer]? Call back  Best Call Back Number:.[phone]      Additional Information: Na      Thanks  VITALIY

## 2024-07-31 ENCOUNTER — OFFICE VISIT (OUTPATIENT)
Dept: OBSTETRICS AND GYNECOLOGY | Facility: CLINIC | Age: 59
End: 2024-07-31
Payer: COMMERCIAL

## 2024-07-31 VITALS
WEIGHT: 182 LBS | DIASTOLIC BLOOD PRESSURE: 78 MMHG | BODY MASS INDEX: 24.68 KG/M2 | HEART RATE: 88 BPM | SYSTOLIC BLOOD PRESSURE: 114 MMHG

## 2024-07-31 DIAGNOSIS — Z98.890 POST-OPERATIVE STATE: ICD-10-CM

## 2024-07-31 DIAGNOSIS — N95.0 POSTMENOPAUSE BLEEDING: ICD-10-CM

## 2024-07-31 DIAGNOSIS — N93.9 ABNORMAL UTERINE BLEEDING: Primary | ICD-10-CM

## 2024-07-31 PROCEDURE — 99213 OFFICE O/P EST LOW 20 MIN: CPT | Mod: S$GLB,,, | Performed by: OBSTETRICS & GYNECOLOGY

## 2024-07-31 RX ORDER — IBUPROFEN 600 MG/1
600 TABLET ORAL 3 TIMES DAILY
Qty: 18 TABLET | Refills: 1 | Status: SHIPPED | OUTPATIENT
Start: 2024-07-31

## 2024-07-31 RX ORDER — MISOPROSTOL 200 UG/1
TABLET ORAL
Qty: 9 TABLET | Refills: 0 | Status: SHIPPED | OUTPATIENT
Start: 2024-07-31

## 2024-07-31 RX ORDER — HYDROCODONE BITARTRATE AND ACETAMINOPHEN 5; 325 MG/1; MG/1
1 TABLET ORAL EVERY 6 HOURS PRN
Qty: 12 TABLET | Refills: 0 | Status: SHIPPED | OUTPATIENT
Start: 2024-07-31

## 2024-07-31 RX ORDER — PROMETHAZINE HYDROCHLORIDE 12.5 MG/1
12.5 TABLET ORAL 4 TIMES DAILY
Qty: 8 TABLET | Refills: 1 | Status: SHIPPED | OUTPATIENT
Start: 2024-07-31

## 2024-07-31 NOTE — PROGRESS NOTES
Patient ID: Idalmis Braden is a 59 y.o. female.     Chief Complaint:  Vaginal Bleeding        History of Present Illness  She is here to evaluate .post menopausal bleeding she is on her HRT   she has had bleeding on and off despite other evaluations will move forward with a D&C and hysteroscopy        GYN & OB History  No LMP recorded. Patient is postmenopausal.      Date of Last Pap: 4/1/2024     OB History   No obstetric history on file.         Review of Systems  Review of Systems   Constitutional:  Negative for activity change.   Eyes:  Negative for visual disturbance.   Respiratory:  Negative for shortness of breath.    Cardiovascular:  Negative for chest pain.   Gastrointestinal:  Negative for abdominal pain.   Genitourinary:  Positive for vaginal bleeding.        No abnormal vaginal bleeding   Musculoskeletal:  Negative for back pain.   Integumentary:  Negative for rash and breast mass.   Neurological:  Negative for numbness.   Psychiatric/Behavioral:          No mood disturbance or changes    Breast: Negative for mass                 Objective:      Objective  Physical Exam:   Constitutional: She is oriented to person, place, and time. She appears well-developed.               Genitourinary:    Vagina, uterus, right adnexa and left adnexa normal.      Pelvic exam was performed with patient supine.      Labial bartholins normal.There is no tenderness or lesion on the right labia. There is no tenderness or lesion on the left labia. Cervix is normal.    Genitourinary Comments: Blood in the vault                  Neurological: She is alert and oriented to person, place, and time.              Assessment:         Assessment  1. Postmenopause bleeding                      Plan:      Plan           Hysteroscopy with myosure  discussed with her in detail

## 2024-08-01 LAB
APPEARANCE, UA: ABNORMAL
BACTERIA SPEC CULT: ABNORMAL /HPF
BASOPHILS NFR BLD: 1 % (ref 0–3)
BILIRUB UR QL STRIP: NEGATIVE MG/DL
COLOR UR: ABNORMAL
EOSINOPHIL NFR BLD: 3.5 % (ref 1–3)
ERYTHROCYTE [DISTWIDTH] IN BLOOD BY AUTOMATED COUNT: 13.6 % (ref 12.5–18)
GLUCOSE (UA): NORMAL MG/DL
HCT VFR BLD AUTO: 40.1 % (ref 37–47)
HGB BLD-MCNC: 13.8 G/DL (ref 12–16)
HGB UR QL STRIP: 150 /UL
KETONES UR QL STRIP: NEGATIVE MG/DL
LEUKOCYTE ESTERASE UR QL STRIP: 25 /UL
LYMPHOCYTES NFR BLD: 31.5 % (ref 25–40)
MCH RBC QN AUTO: 30.5 PG (ref 27–31.2)
MCHC RBC AUTO-ENTMCNC: 34.4 G/DL (ref 31.8–35.4)
MCV RBC AUTO: 88.5 FL (ref 80–97)
MONOCYTES NFR BLD: 7.3 % (ref 1–15)
NEUTROPHILS # BLD AUTO: 3.89 10*3/UL (ref 1.8–7.7)
NEUTROPHILS NFR BLD: 56.4 % (ref 37–80)
NITRITE UR QL STRIP: NEGATIVE
NUCLEATED RED BLOOD CELLS: 0 %
PH UR STRIP: 5 PH (ref 5–9)
PLATELETS: 263 10*3/UL (ref 142–424)
PROT UR QL STRIP: ABNORMAL MG/DL
RBC # BLD AUTO: 4.53 10*6/UL (ref 4.2–5.4)
RBC #/AREA URNS HPF: ABNORMAL /HPF (ref 0–2)
SERVICE COMMENT 03: ABNORMAL
SP GR UR STRIP: 1.02 (ref 1–1.03)
SPECIMEN COLLECTION METHOD, URINE: ABNORMAL
SQUAMOUS EPITHELIAL, UA: ABNORMAL /LPF
UROBILINOGEN UR STRIP-ACNC: NORMAL MG/DL
WBC # BLD: 6.9 10*3/UL (ref 4.6–10.2)
WBC #/AREA URNS HPF: ABNORMAL /HPF (ref 0–5)

## 2024-08-02 ENCOUNTER — TELEPHONE (OUTPATIENT)
Dept: OBSTETRICS AND GYNECOLOGY | Facility: CLINIC | Age: 59
End: 2024-08-02
Payer: COMMERCIAL

## 2024-08-02 DIAGNOSIS — G89.18 POST-OP PAIN: Primary | ICD-10-CM

## 2024-08-02 LAB — RPR: NON REACTIVE

## 2024-08-02 RX ORDER — HYDROCODONE BITARTRATE AND ACETAMINOPHEN 10; 325 MG/1; MG/1
0.5 TABLET ORAL EVERY 4 HOURS PRN
Qty: 4 TABLET | Refills: 0 | Status: SHIPPED | OUTPATIENT
Start: 2024-08-02

## 2024-08-02 NOTE — TELEPHONE ENCOUNTER
Please send new rx for patient to get 10 mg of norco as that is all that her pharmacy has in stock.

## 2024-08-02 NOTE — TELEPHONE ENCOUNTER
Spoke with pt. [facility] does not have norco 5 mg. They have Norco 10 mg. I told her I would speak with Dr. Hardy and get back with her.

## 2024-08-02 NOTE — TELEPHONE ENCOUNTER
----- Message from Gerda Sunil sent at 8/2/2024  9:58 AM CDT -----  Regarding: Call Back  Contact: Patient  Per phone call with patient, she stated that she is having a procedure on Monday and she is needing to speak with the nurse because the medication that was called out the pharmacy does not have it.  Pleaser return call at [phone] (home).    Thanks,  SJ

## 2024-08-05 ENCOUNTER — OUTSIDE PLACE OF SERVICE (OUTPATIENT)
Dept: OBSTETRICS AND GYNECOLOGY | Facility: CLINIC | Age: 59
End: 2024-08-05
Payer: COMMERCIAL

## 2024-08-12 ENCOUNTER — OFFICE VISIT (OUTPATIENT)
Dept: OBSTETRICS AND GYNECOLOGY | Facility: CLINIC | Age: 59
End: 2024-08-12
Payer: COMMERCIAL

## 2024-08-12 VITALS
HEIGHT: 72 IN | DIASTOLIC BLOOD PRESSURE: 77 MMHG | BODY MASS INDEX: 24.65 KG/M2 | WEIGHT: 182 LBS | SYSTOLIC BLOOD PRESSURE: 111 MMHG | HEART RATE: 69 BPM

## 2024-08-12 DIAGNOSIS — Z98.890 POST-OPERATIVE STATE: Primary | ICD-10-CM

## 2024-08-12 NOTE — PROGRESS NOTES
Subjective:       Patient ID: Idalmis Braden is a 59 y.o. female.    Chief Complaint:  Post-op Evaluation      History of Present Illness  She is doing well.  No new health issues,  No abnormal bleeding, No GI or Gu concerns,  No dyspareunia  Status post  hysteroscopy with polypectomy  doing great and benign path      GYN & OB History  No LMP recorded. Patient is postmenopausal.     Date of Last Pap: 4/1/2024    OB History   No obstetric history on file.       Review of Systems  Review of Systems          Objective:    Physical Exam       Assessment:        1. Post-operative state                 Plan:              discussed will return for her annual     Pt is aware we call all results. If she does not hear from our office regarding her result within a week of having a study or procedure performed she is to call the office so that we can research the result for her.  Normal activities discussed.   She is to return for any reason.      Chaperone was present

## 2025-02-03 DIAGNOSIS — Z79.890 HORMONE REPLACEMENT THERAPY: ICD-10-CM

## 2025-02-03 RX ORDER — ESTRADIOL 1 MG/1
TABLET ORAL
Qty: 90 TABLET | Refills: 3 | Status: SHIPPED | OUTPATIENT
Start: 2025-02-03

## 2025-03-13 ENCOUNTER — DOCUMENTATION ONLY (OUTPATIENT)
Dept: OBSTETRICS AND GYNECOLOGY | Facility: CLINIC | Age: 60
End: 2025-03-13
Payer: COMMERCIAL

## 2025-03-27 ENCOUNTER — TELEPHONE (OUTPATIENT)
Dept: OBSTETRICS AND GYNECOLOGY | Facility: CLINIC | Age: 60
End: 2025-03-27
Payer: COMMERCIAL

## 2025-03-27 ENCOUNTER — PATIENT MESSAGE (OUTPATIENT)
Dept: OBSTETRICS AND GYNECOLOGY | Facility: CLINIC | Age: 60
End: 2025-03-27
Payer: COMMERCIAL

## 2025-03-27 NOTE — TELEPHONE ENCOUNTER
Attempted to call pt regarding her appointment on 03/31/25. Dr. Hardy is no longer going to be in clinic that morning so I wanted to she if she was available to come in that afternoon. Advised pt to call/message office back. I also sent message through portal.

## 2025-03-28 ENCOUNTER — TELEPHONE (OUTPATIENT)
Dept: OBSTETRICS AND GYNECOLOGY | Facility: CLINIC | Age: 60
End: 2025-03-28
Payer: COMMERCIAL

## 2025-03-28 NOTE — TELEPHONE ENCOUNTER
Attempted to call pt again regarding her appointment on 03/31/25 to get it rescheduled. No answer. LM advising her to please call me back so that I can get her rescheduled.

## 2025-03-31 ENCOUNTER — TELEPHONE (OUTPATIENT)
Dept: OBSTETRICS AND GYNECOLOGY | Facility: CLINIC | Age: 60
End: 2025-03-31

## 2025-03-31 ENCOUNTER — OFFICE VISIT (OUTPATIENT)
Dept: OBSTETRICS AND GYNECOLOGY | Facility: CLINIC | Age: 60
End: 2025-03-31
Payer: COMMERCIAL

## 2025-03-31 VITALS
WEIGHT: 180 LBS | BODY MASS INDEX: 24.41 KG/M2 | HEART RATE: 79 BPM | SYSTOLIC BLOOD PRESSURE: 100 MMHG | DIASTOLIC BLOOD PRESSURE: 70 MMHG

## 2025-03-31 DIAGNOSIS — Z01.419 WELL WOMAN EXAM WITH ROUTINE GYNECOLOGICAL EXAM: ICD-10-CM

## 2025-03-31 DIAGNOSIS — Z78.0 MENOPAUSE: ICD-10-CM

## 2025-03-31 DIAGNOSIS — Z79.890 HORMONE REPLACEMENT THERAPY: ICD-10-CM

## 2025-03-31 DIAGNOSIS — F32.A ANXIETY AND DEPRESSION: ICD-10-CM

## 2025-03-31 DIAGNOSIS — N95.2 VAGINAL ATROPHY: Primary | ICD-10-CM

## 2025-03-31 DIAGNOSIS — Z12.31 BREAST CANCER SCREENING BY MAMMOGRAM: ICD-10-CM

## 2025-03-31 DIAGNOSIS — F41.9 ANXIETY AND DEPRESSION: ICD-10-CM

## 2025-03-31 PROCEDURE — 99396 PREV VISIT EST AGE 40-64: CPT | Mod: S$PBB,,, | Performed by: OBSTETRICS & GYNECOLOGY

## 2025-03-31 RX ORDER — ESTRADIOL 1 MG/1
1 TABLET ORAL DAILY
Qty: 90 TABLET | Refills: 4 | Status: SHIPPED | OUTPATIENT
Start: 2025-03-31

## 2025-03-31 RX ORDER — ESTRADIOL 0.1 MG/G
1 CREAM VAGINAL
Qty: 42.5 G | Refills: 3 | Status: SHIPPED | OUTPATIENT
Start: 2025-03-31 | End: 2026-03-31

## 2025-03-31 RX ORDER — ESCITALOPRAM OXALATE 10 MG/1
10 TABLET ORAL DAILY
Qty: 90 TABLET | Refills: 4 | Status: SHIPPED | OUTPATIENT
Start: 2025-03-31

## 2025-03-31 RX ORDER — PROGESTERONE 200 MG/1
200 CAPSULE ORAL NIGHTLY
Qty: 90 CAPSULE | Refills: 4 | Status: SHIPPED | OUTPATIENT
Start: 2025-03-31 | End: 2026-03-31

## 2025-03-31 NOTE — PROGRESS NOTES
Subjective:       Patient ID: Idalmis Braden is a 59 y.o. female.    Chief Complaint:  Well Woman, Abdominal Pain (Pt c/o lower abdominal discomfort. She states that it feels almost like a menstrual cramp.), and Painful [place] (Pt states that intercourse has become very painful. This is new and lubricant or nothing is helping.)      History of Present Illness  She is doing well.  No new health issues,  No abnormal bleeding, No GI or Gu concerns,  No dyspareunia.   They are moving to [place] for work  She has noted that intercourse has become very painful despite lubricant.  She  is taking her HRT and had some post menopausal bleeding   the hysteroscopy showed a polyp that was removed and the bleeding has subsequently resolved.    She is doing very well with her lexapro and desires to continue       Abdominal Pain      Vitals:    03/31/25 1438   BP: 100/70   Pulse: 79   Weight: 81.6 kg (180 lb)       GYN & OB History  No LMP recorded. Patient is postmenopausal.     Date of Last Pap: 4/1/2024    OB History   No obstetric history on file.     Past Medical History:   Diagnosis Date    Anxiety and depression     IUD (intrauterine device) in place     Postmenopausal bleeding     HRT        Past Surgical History:   Procedure Laterality Date    ARTHOROSCOPY      RIGHT KNEE    BUNIONECTOMY      OOPHORECTOMY Bilateral         Review of Systems  Review of Systems   Constitutional:  Negative for activity change.   Eyes:  Negative for visual disturbance.   Respiratory:  Negative for shortness of breath.    Cardiovascular:  Negative for chest pain.   Gastrointestinal:  Positive for abdominal pain.   Genitourinary:  Negative for vaginal bleeding.        No abnormal vaginal bleeding   Musculoskeletal:  Negative for back pain.   Integumentary:  Negative for rash and breast mass.   Neurological:  Negative for numbness.   Psychiatric/Behavioral:          No mood disturbance or changes    Breast: Negative for mass             Objective:    Physical Exam:   Constitutional: She is oriented to person, place, and time. She appears well-developed. She is cooperative.    HENT:   Head: Normocephalic.     Neck: Trachea normal. No thyromegaly present.    Cardiovascular:  Normal rate, regular rhythm and normal heart sounds.             Pulmonary/Chest: Effort normal and breath sounds normal. Right breast exhibits no mass, no nipple discharge and no skin change. Left breast exhibits no mass, no nipple discharge and no skin change.   Bilateral fibrocystic changes  During the exam self breast exams discussed         Abdominal: Soft. There is no abdominal tenderness. There is no rebound and no guarding.     Genitourinary:    Vagina, uterus and rectum normal.   Rectum:      Guaiac result negative.   Guaiac negative stool.    Pelvic exam was performed with patient supine.     Labial bartholins normal.There is no lesion on the right labia. There is no lesion on the left labia. Cervix is normal. Right adnexum displays no mass and no tenderness. Left adnexum displays no mass and no tenderness. Cervix exhibits no discharge. Uterus is not enlarged and not tender.              Lymphadenopathy:        Head (right side): No submental and no submandibular adenopathy present.        Head (left side): No submental and no submandibular adenopathy present.     She has no cervical adenopathy.    Neurological: She is alert and oriented to person, place, and time.    Skin: Skin is warm.    Psychiatric: She has a normal mood and affect. Her speech is normal and behavior is normal. Thought content normal.          Assessment:        1. Vaginal atrophy    2. Well woman exam with routine gynecological exam    3. Menopause               Plan:             Pap preformed     will try estrace in her vagina for help with her atrophy   Mammogram  Follow up one year or sooner if needed  Self breast exams  Consider health profile if labs not done with PCP  Recommend colonoscopy  as indicated  Bone density discussed   Pt is aware we call all results. If she does not hear from our office regarding her result within a week of having a study or procedure performed she is to call the office so that we can research the result for her as I do not know when she is scheduling her procedures.   Chaperone was present

## 2025-03-31 NOTE — TELEPHONE ENCOUNTER
----- Message from Kadie sent at 3/31/2025  9:55 AM CDT -----  Type:  Needs Medical AdviceWho Called: Idalmis Holden (please be specific): - How long has patient had these symptoms:  -Pharmacy name and phone #:  -Would the patient rather a call back or a response via [facility]?  Best Call Back Number: [phone] Information: pt calling to RS appt for afternoon says she can come in just needs to know what time please call

## 2025-04-09 ENCOUNTER — RESULTS FOLLOW-UP (OUTPATIENT)
Dept: OBSTETRICS AND GYNECOLOGY | Facility: CLINIC | Age: 60
End: 2025-04-09

## 2025-04-09 LAB — Lab: NORMAL
